# Patient Record
Sex: MALE | Race: WHITE | Employment: PART TIME | URBAN - METROPOLITAN AREA
[De-identification: names, ages, dates, MRNs, and addresses within clinical notes are randomized per-mention and may not be internally consistent; named-entity substitution may affect disease eponyms.]

---

## 2021-11-01 ENCOUNTER — LAB ENCOUNTER (OUTPATIENT)
Dept: LAB | Age: 21
End: 2021-11-01
Attending: FAMILY MEDICINE
Payer: COMMERCIAL

## 2021-11-01 ENCOUNTER — OFFICE VISIT (OUTPATIENT)
Dept: FAMILY MEDICINE CLINIC | Facility: CLINIC | Age: 21
End: 2021-11-01
Payer: COMMERCIAL

## 2021-11-01 VITALS
WEIGHT: 203 LBS | SYSTOLIC BLOOD PRESSURE: 120 MMHG | RESPIRATION RATE: 16 BRPM | HEART RATE: 78 BPM | DIASTOLIC BLOOD PRESSURE: 82 MMHG | HEIGHT: 73 IN | BODY MASS INDEX: 26.9 KG/M2 | OXYGEN SATURATION: 98 %

## 2021-11-01 DIAGNOSIS — Z11.3 SCREEN FOR STD (SEXUALLY TRANSMITTED DISEASE): ICD-10-CM

## 2021-11-01 DIAGNOSIS — R68.89 SENSATION OF FEELING HOT: ICD-10-CM

## 2021-11-01 DIAGNOSIS — R53.83 FATIGUE, UNSPECIFIED TYPE: ICD-10-CM

## 2021-11-01 DIAGNOSIS — R41.840 DIFFICULTY CONCENTRATING: ICD-10-CM

## 2021-11-01 DIAGNOSIS — R68.82 LOW LIBIDO: ICD-10-CM

## 2021-11-01 DIAGNOSIS — J35.1 LARGE TONSILS: ICD-10-CM

## 2021-11-01 DIAGNOSIS — R06.83 SNORING: ICD-10-CM

## 2021-11-01 DIAGNOSIS — Z00.00 WELLNESS EXAMINATION: Primary | ICD-10-CM

## 2021-11-01 DIAGNOSIS — Z13.220 LIPID SCREENING: ICD-10-CM

## 2021-11-01 PROCEDURE — 99385 PREV VISIT NEW AGE 18-39: CPT | Performed by: FAMILY MEDICINE

## 2021-11-01 PROCEDURE — 84443 ASSAY THYROID STIM HORMONE: CPT

## 2021-11-01 PROCEDURE — 3008F BODY MASS INDEX DOCD: CPT | Performed by: FAMILY MEDICINE

## 2021-11-01 PROCEDURE — 86592 SYPHILIS TEST NON-TREP QUAL: CPT | Performed by: FAMILY MEDICINE

## 2021-11-01 PROCEDURE — 80061 LIPID PANEL: CPT | Performed by: FAMILY MEDICINE

## 2021-11-01 PROCEDURE — 36415 COLL VENOUS BLD VENIPUNCTURE: CPT

## 2021-11-01 PROCEDURE — 80053 COMPREHEN METABOLIC PANEL: CPT

## 2021-11-01 PROCEDURE — 3074F SYST BP LT 130 MM HG: CPT | Performed by: FAMILY MEDICINE

## 2021-11-01 PROCEDURE — 3079F DIAST BP 80-89 MM HG: CPT | Performed by: FAMILY MEDICINE

## 2021-11-01 PROCEDURE — 85025 COMPLETE CBC W/AUTO DIFF WBC: CPT

## 2021-11-01 PROCEDURE — 87389 HIV-1 AG W/HIV-1&-2 AB AG IA: CPT | Performed by: FAMILY MEDICINE

## 2021-11-01 PROCEDURE — 84403 ASSAY OF TOTAL TESTOSTERONE: CPT

## 2021-11-01 NOTE — PROGRESS NOTES
HPI:   Enedina Brewer is a 19year old male who presents for an Annual Health Visit. Is worried he has ADHD, but isn't sure. Tonsil were big, and was told could be having sleep issues, which could be part of the issue.  Has been treated with adde intact. Fundi benign. Ears: normal TM's and external ear canals both ears  Nose: Nares normal. Septum midline. Mucosa normal. No drainage or sinus tenderness.   Throat: lips, mucosa, and tongue normal; teeth and gums normal  Neck: no adenopathy, no carotid sleep apnea is a cause for many of his symptoms will get ENT eval/opinion    Will check labs for metablic causes of his symptoms. There are no Patient Instructions on file for this visit.     The patient indicates understanding of these issues and agrees

## 2024-08-22 ENCOUNTER — OFFICE VISIT (OUTPATIENT)
Dept: FAMILY MEDICINE CLINIC | Facility: CLINIC | Age: 24
End: 2024-08-22
Payer: COMMERCIAL

## 2024-08-22 VITALS
BODY MASS INDEX: 26.94 KG/M2 | SYSTOLIC BLOOD PRESSURE: 120 MMHG | HEART RATE: 68 BPM | OXYGEN SATURATION: 100 % | HEIGHT: 73 IN | TEMPERATURE: 98 F | WEIGHT: 203.25 LBS | RESPIRATION RATE: 16 BRPM | DIASTOLIC BLOOD PRESSURE: 60 MMHG

## 2024-08-22 DIAGNOSIS — R79.89 LOW TESTOSTERONE: Primary | ICD-10-CM

## 2024-08-22 PROCEDURE — 3008F BODY MASS INDEX DOCD: CPT | Performed by: STUDENT IN AN ORGANIZED HEALTH CARE EDUCATION/TRAINING PROGRAM

## 2024-08-22 PROCEDURE — 3078F DIAST BP <80 MM HG: CPT | Performed by: STUDENT IN AN ORGANIZED HEALTH CARE EDUCATION/TRAINING PROGRAM

## 2024-08-22 PROCEDURE — 3074F SYST BP LT 130 MM HG: CPT | Performed by: STUDENT IN AN ORGANIZED HEALTH CARE EDUCATION/TRAINING PROGRAM

## 2024-08-22 PROCEDURE — 99214 OFFICE O/P EST MOD 30 MIN: CPT | Performed by: STUDENT IN AN ORGANIZED HEALTH CARE EDUCATION/TRAINING PROGRAM

## 2024-08-22 RX ORDER — TESTOSTERONE CYPIONATE 1000 MG/10ML
150 INJECTION, SOLUTION INTRAMUSCULAR WEEKLY
Qty: 10 ML | Refills: 2 | Status: SHIPPED | OUTPATIENT
Start: 2024-08-22

## 2024-08-22 RX ORDER — TESTOSTERONE CYPIONATE 1000 MG/10ML
INJECTION, SOLUTION INTRAMUSCULAR
COMMUNITY
Start: 2024-05-02 | End: 2024-08-22

## 2024-08-22 NOTE — PROGRESS NOTES
Subjective:      Chief Complaint   Patient presents with    Establish Care    Medication Request     Needs refill on Testosterone      HISTORY OF PRESENT ILLNESS  HPI  HPI obtained per patient report.  Óscar Dietz is a pleasant 24 year old male presenting to [facility].   He requests refills for testosterone cypionate and an endocrinology referral. He states that he had been stable on 150 mg weekly but he had an interruption in his medication course due to a recent change in insurance.     PAST PATIENT HISTORY  Past Medical History:    Low testosterone     History reviewed. No pertinent surgical history.    CURRENT MEDICATIONS  Outpatient Medications Marked as Taking for the 8/22/24 encounter (Office Visit) with Aubree Santillan MD   Medication Sig Dispense Refill    Testosterone Cypionate 100 MG/ML Intramuscular Solution Inject 1.5 mL (150 mg total) into the muscle once a week. 10 mL 2       HEALTH MAINTENANCE    There is no immunization history on file for this patient.    ALLERGIES AND DRUG REACTIONS  No Known Allergies    History reviewed. No pertinent family history.  Social History     Socioeconomic History    Marital status: Single   Tobacco Use    Smoking status: Never    Smokeless tobacco: Never   Vaping Use    Vaping status: Never Used   Substance and Sexual Activity    Alcohol use: Not Currently    Drug use: Never       Review of Systems   All other systems reviewed and are negative.         Objective:      /60   Pulse 68   Temp 97.7 °F (36.5 °C) (Temporal)   Resp 16   Ht 6' 1\" (1.854 m)   Wt 203 lb 4 oz (92.2 kg)   SpO2 100%   BMI 26.82 kg/m²   Body mass index is 26.82 kg/m².    Physical Exam  Vitals reviewed.   Constitutional:       General: He is not in acute distress.     Appearance: He is not ill-appearing, toxic-appearing or diaphoretic.   Cardiovascular:      Rate and Rhythm: Normal rate.   Pulmonary:      Effort: Pulmonary effort is normal.   Abdominal:      General: Abdomen is flat.    Musculoskeletal:      Cervical back: Neck supple.   Neurological:      Mental Status: He is alert and oriented to person, place, and time.   Psychiatric:         Mood and Affect: Mood normal.            Assessment and Plan:      1. Low testosterone (Primary)  -     CBC With Differential With Platelet; Future; Expected date: 08/22/2024  -     Comp Metabolic Panel (14); Future; Expected date: 08/22/2024  -     Lipid Panel; Future; Expected date: 08/22/2024  -     Testosterone Total; Future; Expected date: 08/22/2024  -     Testosterone Cypionate; Inject 1.5 mL (150 mg total) into the muscle once a week.  Dispense: 10 mL; Refill: 2  -     TSH W Reflex To Free T4; Future; Expected date: 08/22/2024  -     ENDOCRINOLOGY - INTERNAL    Return in about 2 months (around 10/22/2024) for follow-up, physical.    - testosterone cypionate refill was sent for him today  - recommended rechecking labs once he has been back on this medication for at least 2 months  - endocrinology referral was provided for further follow-up    Patient verbalized understanding of assessment and recommendations. All questions and concerns were addressed.    Electronically signed by Aubree Santillan MD

## 2024-09-06 ENCOUNTER — LAB ENCOUNTER (OUTPATIENT)
Dept: LAB | Age: 24
End: 2024-09-06
Attending: STUDENT IN AN ORGANIZED HEALTH CARE EDUCATION/TRAINING PROGRAM
Payer: COMMERCIAL

## 2024-09-06 DIAGNOSIS — R79.89 LOW TESTOSTERONE: ICD-10-CM

## 2024-09-06 LAB
ALBUMIN SERPL-MCNC: 4.8 G/DL (ref 3.2–4.8)
ALBUMIN/GLOB SERPL: 1.7 {RATIO} (ref 1–2)
ALP LIVER SERPL-CCNC: 84 U/L
ALT SERPL-CCNC: 25 U/L
ANION GAP SERPL CALC-SCNC: 5 MMOL/L (ref 0–18)
AST SERPL-CCNC: 22 U/L (ref ?–34)
BASOPHILS # BLD AUTO: 0.06 X10(3) UL (ref 0–0.2)
BASOPHILS NFR BLD AUTO: 1.3 %
BILIRUB SERPL-MCNC: 2.2 MG/DL (ref 0.3–1.2)
BUN BLD-MCNC: 12 MG/DL (ref 9–23)
CALCIUM BLD-MCNC: 10.1 MG/DL (ref 8.7–10.4)
CHLORIDE SERPL-SCNC: 104 MMOL/L (ref 98–112)
CHOLEST SERPL-MCNC: 206 MG/DL (ref ?–200)
CO2 SERPL-SCNC: 31 MMOL/L (ref 21–32)
CREAT BLD-MCNC: 0.94 MG/DL
EGFRCR SERPLBLD CKD-EPI 2021: 115 ML/MIN/1.73M2 (ref 60–?)
EOSINOPHIL # BLD AUTO: 0.21 X10(3) UL (ref 0–0.7)
EOSINOPHIL NFR BLD AUTO: 4.7 %
ERYTHROCYTE [DISTWIDTH] IN BLOOD BY AUTOMATED COUNT: 12.3 %
FASTING PATIENT LIPID ANSWER: YES
FASTING STATUS PATIENT QL REPORTED: YES
GLOBULIN PLAS-MCNC: 2.9 G/DL (ref 2–3.5)
GLUCOSE BLD-MCNC: 88 MG/DL (ref 70–99)
HCT VFR BLD AUTO: 43.9 %
HDLC SERPL-MCNC: 45 MG/DL (ref 40–59)
HGB BLD-MCNC: 15.2 G/DL
IMM GRANULOCYTES # BLD AUTO: 0.01 X10(3) UL (ref 0–1)
IMM GRANULOCYTES NFR BLD: 0.2 %
LDLC SERPL CALC-MCNC: 146 MG/DL (ref ?–100)
LYMPHOCYTES # BLD AUTO: 1.81 X10(3) UL (ref 1–4)
LYMPHOCYTES NFR BLD AUTO: 40.4 %
MCH RBC QN AUTO: 31.2 PG (ref 26–34)
MCHC RBC AUTO-ENTMCNC: 34.6 G/DL (ref 31–37)
MCV RBC AUTO: 90.1 FL
MONOCYTES # BLD AUTO: 0.43 X10(3) UL (ref 0.1–1)
MONOCYTES NFR BLD AUTO: 9.6 %
NEUTROPHILS # BLD AUTO: 1.96 X10 (3) UL (ref 1.5–7.7)
NEUTROPHILS # BLD AUTO: 1.96 X10(3) UL (ref 1.5–7.7)
NEUTROPHILS NFR BLD AUTO: 43.8 %
NONHDLC SERPL-MCNC: 161 MG/DL (ref ?–130)
OSMOLALITY SERPL CALC.SUM OF ELEC: 289 MOSM/KG (ref 275–295)
PLATELET # BLD AUTO: 226 10(3)UL (ref 150–450)
POTASSIUM SERPL-SCNC: 4.2 MMOL/L (ref 3.5–5.1)
PROT SERPL-MCNC: 7.7 G/DL (ref 5.7–8.2)
RBC # BLD AUTO: 4.87 X10(6)UL
SODIUM SERPL-SCNC: 140 MMOL/L (ref 136–145)
TESTOST SERPL-MCNC: 246.9 NG/DL
TRIGL SERPL-MCNC: 81 MG/DL (ref 30–149)
TSI SER-ACNC: 2.12 MIU/ML (ref 0.55–4.78)
VLDLC SERPL CALC-MCNC: 15 MG/DL (ref 0–30)
WBC # BLD AUTO: 4.5 X10(3) UL (ref 4–11)

## 2024-09-06 PROCEDURE — 36415 COLL VENOUS BLD VENIPUNCTURE: CPT

## 2024-09-06 PROCEDURE — 80053 COMPREHEN METABOLIC PANEL: CPT

## 2024-09-06 PROCEDURE — 84443 ASSAY THYROID STIM HORMONE: CPT

## 2024-09-06 PROCEDURE — 85025 COMPLETE CBC W/AUTO DIFF WBC: CPT

## 2024-09-06 PROCEDURE — 84403 ASSAY OF TOTAL TESTOSTERONE: CPT

## 2024-09-06 PROCEDURE — 80061 LIPID PANEL: CPT

## 2024-09-10 NOTE — PROGRESS NOTES
Vivek Cantrell,     Your lab results showed mildly elevated cholesterol (similar to 2 years ago) but were otherwise normal. Please continue to keep active, increase your intake of fiber and omega-3s, and reduce your intake of saturated fats. I would recommend monitoring your levels annually at this time.     Dr. Santillan

## 2024-11-07 ENCOUNTER — OFFICE VISIT (OUTPATIENT)
Facility: LOCATION | Age: 24
End: 2024-11-07
Payer: COMMERCIAL

## 2024-11-07 VITALS
DIASTOLIC BLOOD PRESSURE: 78 MMHG | BODY MASS INDEX: 26.95 KG/M2 | SYSTOLIC BLOOD PRESSURE: 118 MMHG | HEIGHT: 74 IN | HEART RATE: 84 BPM | WEIGHT: 210 LBS

## 2024-11-07 DIAGNOSIS — E78.2 MIXED HYPERLIPIDEMIA: ICD-10-CM

## 2024-11-07 DIAGNOSIS — R79.89 LOW TESTOSTERONE: ICD-10-CM

## 2024-11-07 DIAGNOSIS — E66.3 OVERWEIGHT (BMI 25.0-29.9): ICD-10-CM

## 2024-11-07 DIAGNOSIS — E29.1 HYPOGONADISM IN MALE: Primary | ICD-10-CM

## 2024-11-07 PROCEDURE — 3074F SYST BP LT 130 MM HG: CPT | Performed by: INTERNAL MEDICINE

## 2024-11-07 PROCEDURE — 99205 OFFICE O/P NEW HI 60 MIN: CPT | Performed by: INTERNAL MEDICINE

## 2024-11-07 PROCEDURE — 3008F BODY MASS INDEX DOCD: CPT | Performed by: INTERNAL MEDICINE

## 2024-11-07 PROCEDURE — 3078F DIAST BP <80 MM HG: CPT | Performed by: INTERNAL MEDICINE

## 2024-11-07 NOTE — PROGRESS NOTES
New Patient Evaluation - History and Physical    CONSULT - Reason for Visit:    low testosterone     Requesting Physician:   .MD Marco Antonio Rangel M.D  CHIEF COMPLAINT:    Chief Complaint   Patient presents with    Consult     Testosterone         HISTORY OF PRESENT ILLNESS:   Óscar Dietz is a 24 year old male who presents with    In 2023, started TRT. He was getting it from a Dr under his mother's insurance.   He does not know why testosterone was low.   He was told it is normal but   Testo  mg/WK.   Girlfriend is a nurse and she is injecting him.   After high school, started to notice low energy and slow recover. Cannot lift wt   He plays Volleyball.   He was getting injured a lot.     The patient endorses the bolded symptoms: Decreased ability to play sports, falling asleep after dinner, change in mood, sad, does not enjoy life or the same activities as before, change in body hair and facial hair, hot flashes, heat/cold intolerance, ,skin tags, excessive weight gain, Height loss, Hands/shoe/hat size, gynecomastia, galactorrhea, Headache, Change in vision,     Puberty: unremarkable   He does not have biological children at age. Interested in having children in the future.    Steroid: No    Anabolic steroids: No    Previous testosterone or \"supplements\" : IM testo 150 mg/wk started 2023.   Head trauma: No  Infection (mumps) or trauma to the testicles: No  11/2021  Component  Ref Range & Units 3 yr ago Comments   Testosterone  123.06 - 813.86 ng/dL 229.05        ASSESSMENT AND PLAN:  Óscar Dietz is a 24 year old male who presents with  low testosterone     Plan  Hold testosterone   Fasting AM labs after good night sleep  MRI w/ pituitary protocol     We discussed with pt in length and he understands we cannot give testosterone IM Rx if his levels were normal before starting therapy. I need to hold treatment and reassess with more w/u which includes labs, confirmatory testing and MRI.   Discussed  long term side effect of taking exogenous testosterone on fertility and suppressing endogenous testosterone          PAST MEDICAL HISTORY:   Past Medical History:    Hypercholesteremia    Low testosterone       PAST SURGICAL HISTORY:   History reviewed. No pertinent surgical history.  no  CURRENT MEDICATIONS:     Testosterone Cypionate 100 MG/ML Intramuscular Solution Inject 1.5 mL (150 mg total) into the muscle once a week. 10 mL 2       ALLERGIES:  Allergies[1]    SOCIAL HISTORY:    Social History     Socioeconomic History    Marital status: Single   Tobacco Use    Smoking status: Never    Smokeless tobacco: Never   Vaping Use    Vaping status: Never Used   Substance and Sexual Activity    Alcohol use: Not Currently    Drug use: Never     Smoking no  Marijuana no  Etoh no  Drugs no  FAMILY HISTORY:   History reviewed. No pertinent family history.  No thyroid disease   GM CABG     REVIEW OF SYSTEMS:  All negative other than HPI    PHYSICAL EXAM:   Height: 6' 2\" (188 cm) (11/07 1506)  Weight: 210 lb (95.3 kg) (11/07 1506)  BSA (Calculated - sq m): 2.22 sq meters (11/07 1506)  Pulse: 84 (11/07 1506)  BP: 118/78 (11/07 1506)  Temp: --  Do Not Use - Resp Rate: --  SpO2: --    Body mass index is 26.96 kg/m².    CONSTITUTIONAL:  Awake and alert. Age appropriate, good hygiene not in acute distress. Well-nourished and well developed. no acute distress   PSYCH:   Orientated to time, place, person & situation, Normal mood and affect, memory intact, normal insight and judgment, cooperative  Neuro: speech is clear. Awake, alert, no aphasia, no facial asymmetry, no nuchal rigidity  EYES:  No proptosis, no ptosis, conjunctiva normal  ENT:  Normocephalic, atraumatic  Eye: EOMI, normal lids, no discharge, no conjunctival erythema. No exophthalmos/proptosis, Ptosis negative   No rhinorrhea, moist oral mucosa  Neck: full range of motion  Neck/Thyroid: neck inspection: normal, No scar, No goiter   LUNGS:  No acute respiratory  distress, non-labored respiration. Speaking full sentences  CARDIOVASCULAR:  regular rate   ABDOMEN:  No abdominal pain.   SKIN:  no bruising or bleeding, no rashes and no lesions, Skin is dry, no obvious rashes or lesions  EXTREMITIES: no gross abnormality   MSK: Moves extremities spontaneously. full range of motion in all major joints      DATA:     Pertinent data reviewed        No results for input(s): \"TSH\", \"T4F\", \"T3F\", \"THYP\" in the last 72 hours.  No results found.    Orders Placed This Encounter   Procedures    TSH and Free T4    Comp Metabolic Panel (14)    HCG, Beta Subunit (Quant Pregnancy Test)    Prolactin    Testosterone,Total and Weakly Bound w/ SHBG    FSH    Cortisol    Estradiol    IGF-1    LH (Luteinizing Hormone)     Orders Placed This Encounter    TSH and Free T4     Order Specific Question:   Release to patient     Answer:   Immediate    Comp Metabolic Panel (14)     Order Specific Question:   Release to patient     Answer:   Immediate    HCG, Beta Subunit (Quant Pregnancy Test)     Order Specific Question:   Release to patient     Answer:   Immediate    Prolactin     Order Specific Question:   Release to patient     Answer:   Immediate    Testosterone,Total and Weakly Bound w/ SHBG     Order Specific Question:   Release to patient     Answer:   Immediate    FSH     Standing Status:   Future     Standing Expiration Date:   11/7/2025     Order Specific Question:   Release to patient     Answer:   Immediate    Cortisol    Estradiol     Standing Status:   Future     Standing Expiration Date:   11/7/2025     Order Specific Question:   Release to patient     Answer:   Immediate    IGF-1     Order Specific Question:   Release to patient     Answer:   Immediate    LH (Luteinizing Hormone)     Standing Status:   Future     Standing Expiration Date:   11/7/2025     Order Specific Question:   Release to patient     Answer:   Immediate       Latest Reference Range & Units 09/06/24 15:51   Patient  Fasting for CMP?  Yes   Patient Fasting for Lipid?  Yes   TSH 0.550 - 4.780 mIU/mL 2.116   TESTOSTERONE 197.44 - 669.58 ng/dL 246.90     Component 11/01/2021 11/01/2021        Testosterone 229.05 --   TSH -- 0.965       This is a specialized patient consultation in endocrinology and required comprehensive review of prior records, as well as current evaluation, with time required for consideration of complex endocrine issues and consultation. For this visit, I personally interviewed the patient, and family member if accompanied, performed the pertinent parts of the history and physical examination. ROS included screening for appropriate endocrine conditions.   Today's diagnosis and plan were reviewed in detail with the patient who states understanding and agrees with plan. I discussed with the patient possible diagnosis, differential diagnosis, need for work up, treatment options, alternatives and side effects.     Please see note for details about time spent which includes:   · pre-visit preparation  · reviewing records  · face to face time with the patient   · timely documentation of the encounter  · ordering medications/tests  · communication with care team  · care coordination    I appreciate the opportunity to be part of your patient's medical care and will keep you, as the referring and primary physicians, informed about the care of your patient. Please feel free to contact me should you have any questions.    The 21st Century Cures Act makes medical notes like these available to patients in the interest of transparency. Please be advised this is a medical document. Medical documents are intended to carry relevant information, facts as evident, and the clinical opinion of the practitioner. The medical note is intended as peer to peer communication and may appear blunt or direct. It is written in medical language and may contain abbreviations or verbiage that are unfamiliar.   Shaylee Barrow MD               [1] No Known Allergies

## 2024-11-07 NOTE — PATIENT INSTRUCTIONS
Hold testosterone    Fasting AM labs after good night sleep  MRI w/ pituitary protocol     We discussed with pt in length and he understands we cannot give testosterone IM Rx if his levels were normal before starting therapy. I need to hold treatment and reassess with more w/u which includes labs, confirmatory testing and MRI.   Discussed long term side effect of taking exogenous testosterone on fertility and suppressing endogenous testosterone

## 2025-02-02 ENCOUNTER — HOSPITAL ENCOUNTER (OUTPATIENT)
Dept: MRI IMAGING | Age: 25
End: 2025-02-02
Attending: INTERNAL MEDICINE
Payer: COMMERCIAL

## 2025-02-02 ENCOUNTER — HOSPITAL ENCOUNTER (OUTPATIENT)
Dept: MRI IMAGING | Age: 25
Discharge: HOME | End: 2025-02-02
Attending: INTERNAL MEDICINE
Payer: COMMERCIAL

## 2025-02-02 DIAGNOSIS — E78.2 MIXED HYPERLIPIDEMIA: ICD-10-CM

## 2025-02-02 DIAGNOSIS — E29.1 HYPOGONADISM IN MALE: ICD-10-CM

## 2025-02-02 DIAGNOSIS — R79.89 LOW TESTOSTERONE: ICD-10-CM

## 2025-02-02 PROCEDURE — A9575 INJ GADOTERATE MEGLUMI 0.1ML: HCPCS

## 2025-02-02 PROCEDURE — 70553 MRI BRAIN STEM W/O & W/DYE: CPT | Performed by: INTERNAL MEDICINE

## 2025-02-02 RX ORDER — GADOTERATE MEGLUMINE 376.9 MG/ML
20 INJECTION INTRAVENOUS
Status: COMPLETED | OUTPATIENT
Start: 2025-02-02 | End: 2025-02-02

## 2025-02-02 RX ADMIN — GADOTERATE MEGLUMINE 20 ML: 376.9 INJECTION INTRAVENOUS at 13:50:00

## 2025-03-07 ENCOUNTER — OFFICE VISIT (OUTPATIENT)
Dept: FAMILY MEDICINE CLINIC | Facility: CLINIC | Age: 25
End: 2025-03-07
Payer: COMMERCIAL

## 2025-03-07 VITALS
HEIGHT: 74 IN | RESPIRATION RATE: 16 BRPM | TEMPERATURE: 97 F | WEIGHT: 210.25 LBS | BODY MASS INDEX: 26.98 KG/M2 | HEART RATE: 81 BPM | OXYGEN SATURATION: 97 % | SYSTOLIC BLOOD PRESSURE: 122 MMHG | DIASTOLIC BLOOD PRESSURE: 60 MMHG

## 2025-03-07 DIAGNOSIS — R79.89 LOW TESTOSTERONE: ICD-10-CM

## 2025-03-07 DIAGNOSIS — M54.16 LUMBAR RADICULOPATHY: Primary | ICD-10-CM

## 2025-03-07 PROCEDURE — 3074F SYST BP LT 130 MM HG: CPT | Performed by: STUDENT IN AN ORGANIZED HEALTH CARE EDUCATION/TRAINING PROGRAM

## 2025-03-07 PROCEDURE — 3008F BODY MASS INDEX DOCD: CPT | Performed by: STUDENT IN AN ORGANIZED HEALTH CARE EDUCATION/TRAINING PROGRAM

## 2025-03-07 PROCEDURE — 3078F DIAST BP <80 MM HG: CPT | Performed by: STUDENT IN AN ORGANIZED HEALTH CARE EDUCATION/TRAINING PROGRAM

## 2025-03-07 PROCEDURE — 99213 OFFICE O/P EST LOW 20 MIN: CPT | Performed by: STUDENT IN AN ORGANIZED HEALTH CARE EDUCATION/TRAINING PROGRAM

## 2025-03-07 RX ORDER — TESTOSTERONE CYPIONATE 1000 MG/10ML
150 INJECTION, SOLUTION INTRAMUSCULAR WEEKLY
Qty: 10 ML | Refills: 2 | Status: CANCELLED | OUTPATIENT
Start: 2025-03-07

## 2025-03-07 RX ORDER — NAPROXEN 500 MG/1
500 TABLET ORAL 2 TIMES DAILY WITH MEALS
Qty: 14 TABLET | Refills: 0 | Status: SHIPPED | OUTPATIENT
Start: 2025-03-07 | End: 2025-03-14

## 2025-03-07 RX ORDER — CYCLOBENZAPRINE HCL 10 MG
10 TABLET ORAL 3 TIMES DAILY PRN
Qty: 30 TABLET | Refills: 0 | Status: SHIPPED | OUTPATIENT
Start: 2025-03-07 | End: 2025-03-14

## 2025-03-07 RX ORDER — TESTOSTERONE CYPIONATE 1000 MG/10ML
150 INJECTION, SOLUTION INTRAMUSCULAR WEEKLY
Qty: 10 ML | Refills: 2 | OUTPATIENT
Start: 2025-03-07

## 2025-03-07 NOTE — TELEPHONE ENCOUNTER
Requesting Testosterone 100mg  Last OV: 3/7/25  RTC: not noted  Last Rx'd 8/22/24 #10mL wit  2 refills    No future appointments.    [place] , last [place] 1/28/25 #10mL    Controlled med:  Rx pended and routed for approval/denial

## 2025-03-07 NOTE — PROGRESS NOTES
Subjective:      Chief Complaint   Patient presents with    Back Pain     Herniated disc - has been getting getting better alternating ibuprofen, aleve and tylenol - would like to discuss muscle relaxer or anti-inflammatory medications     HISTORY OF PRESENT ILLNESS  HPI  HPI obtained per patient report.  Óscar Dietz is a pleasant 25 year old male presenting with LBP.     He reports a 2 month history of R LBP radiating down the posterior aspect of his R leg to his thigh. Occasionally associated with RLE numbness. His symptoms began after playing in a volleyball tournament. Exacerbated by low back extension. Improved by ibuprofen or aleve. No associated tingling/weakness/incontinence. PT initially exacerbated his symptoms but more recently has been helping with his symptoms.       PAST PATIENT HISTORY  Past Medical History:    Hypercholesteremia    Low testosterone     History reviewed. No pertinent surgical history.    CURRENT MEDICATIONS  Medications Taking[1]    HEALTH MAINTENANCE    There is no immunization history on file for this patient.    ALLERGIES AND DRUG REACTIONS  Allergies[2]    History reviewed. No pertinent family history.  Social History     Socioeconomic History    Marital status: Single   Tobacco Use    Smoking status: Never    Smokeless tobacco: Never   Vaping Use    Vaping status: Never Used   Substance and Sexual Activity    Alcohol use: Not Currently    Drug use: Never       Review of Systems   Musculoskeletal:  Positive for back pain.   Neurological:  Positive for numbness.   All other systems reviewed and are negative.         Objective:      /60   Pulse 81   Temp 97.2 °F (36.2 °C) (Temporal)   Resp 16   Ht 6' 2\" (1.88 m)   Wt 210 lb 4 oz (95.4 kg)   SpO2 97%   BMI 26.99 kg/m²   Body mass index is 26.99 kg/m².    Physical Exam  Vitals reviewed.   Constitutional:       General: He is not in acute distress.     Appearance: He is not ill-appearing, toxic-appearing or diaphoretic.    HENT:      Head: Normocephalic and atraumatic.   Eyes:      General: No scleral icterus.        Right eye: No discharge.         Left eye: No discharge.      Extraocular Movements: Extraocular movements intact.      Conjunctiva/sclera: Conjunctivae normal.   Cardiovascular:      Rate and Rhythm: Normal rate.   Pulmonary:      Effort: Pulmonary effort is normal.   Abdominal:      General: Abdomen is flat.   Musculoskeletal:         General: No swelling, tenderness or deformity. Normal range of motion.      Cervical back: Neck supple.      Comments: Straight leg raise test positive on R   Skin:     General: Skin is warm and dry.      Coloration: Skin is not jaundiced or pale.      Findings: No bruising, erythema or rash.   Neurological:      General: No focal deficit present.      Mental Status: He is alert and oriented to person, place, and time.   Psychiatric:         Mood and Affect: Mood normal.            Assessment and Plan:      1. Lumbar radiculopathy (Primary)  -     Naproxen; Take 1 tablet (500 mg total) by mouth 2 (two) times daily with meals for 7 days.  Dispense: 14 tablet; Refill: 0  -     Cyclobenzaprine HCl; Take 1 tablet (10 mg total) by mouth 3 (three) times daily as needed for Muscle spasms.  Dispense: 30 tablet; Refill: 0  -     MRI SPINE LUMBAR (CPT=72148); Future; Expected date: 03/07/2025    Return if symptoms worsen or fail to improve.    Lumbar radiculopathy  - recommended activity modification, local heat application, PT, and naproxen and flexeril as prescribed. Discussed the R/B/A of these medications and recommended against driving while taking flexeril  - if symptoms do not improve with several weeks of the above conservative therapy, recommended completing MRI for further evaluation and follow-up    Patient verbalized understanding of assessment and recommendations. All questions and concerns were addressed.    Electronically signed by Aubree Santillan MD         [1]   Outpatient Medications  Marked as Taking for the 3/7/25 encounter (Office Visit) with Aubree Santillan MD   Medication Sig Dispense Refill    naproxen 500 MG Oral Tab Take 1 tablet (500 mg total) by mouth 2 (two) times daily with meals for 7 days. 14 tablet 0    cyclobenzaprine 10 MG Oral Tab Take 1 tablet (10 mg total) by mouth 3 (three) times daily as needed for Muscle spasms. 30 tablet 0    Testosterone Cypionate 100 MG/ML Intramuscular Solution Inject 1.5 mL (150 mg total) into the muscle once a week. 10 mL 2   [2] No Known Allergies     Aklief counseling:  Patient advised to apply a pea-sized amount only at bedtime and wait 30 minutes after washing their face before applying.  If too drying, patient may add a non-comedogenic moisturizer.  The most commonly reported side effects including irritation, redness, scaling, dryness, stinging, burning, itching, and increased risk of sunburn.  The patient verbalized understanding of the proper use and possible adverse effects of retinoids.  All of the patient's questions and concerns were addressed.

## 2025-04-07 ENCOUNTER — PATIENT MESSAGE (OUTPATIENT)
Dept: FAMILY MEDICINE CLINIC | Facility: CLINIC | Age: 25
End: 2025-04-07

## 2025-04-07 DIAGNOSIS — R79.89 LOW TESTOSTERONE: Primary | ICD-10-CM

## 2025-05-04 ENCOUNTER — HOSPITAL ENCOUNTER (OUTPATIENT)
Dept: MRI IMAGING | Age: 25
Discharge: HOME | End: 2025-05-04
Attending: STUDENT IN AN ORGANIZED HEALTH CARE EDUCATION/TRAINING PROGRAM
Payer: COMMERCIAL

## 2025-05-04 ENCOUNTER — HOSPITAL ENCOUNTER (OUTPATIENT)
Dept: MRI IMAGING | Age: 25
End: 2025-05-04
Attending: STUDENT IN AN ORGANIZED HEALTH CARE EDUCATION/TRAINING PROGRAM
Payer: COMMERCIAL

## 2025-05-04 DIAGNOSIS — M54.16 LUMBAR RADICULOPATHY: ICD-10-CM

## 2025-05-04 PROCEDURE — 72148 MRI LUMBAR SPINE W/O DYE: CPT | Performed by: STUDENT IN AN ORGANIZED HEALTH CARE EDUCATION/TRAINING PROGRAM

## 2025-05-05 NOTE — PROGRESS NOTES
Vivek Cantrell,     Your lumbar spine MRI shows a herniated disc. If your symptoms have not improved, please follow-up to discuss other treatment options.     Dr. Santillan